# Patient Record
Sex: FEMALE | Race: WHITE | NOT HISPANIC OR LATINO
[De-identification: names, ages, dates, MRNs, and addresses within clinical notes are randomized per-mention and may not be internally consistent; named-entity substitution may affect disease eponyms.]

---

## 2023-07-18 PROBLEM — Z00.00 ENCOUNTER FOR PREVENTIVE HEALTH EXAMINATION: Status: ACTIVE | Noted: 2023-07-18

## 2023-07-21 ENCOUNTER — ASOB RESULT (OUTPATIENT)
Age: 38
End: 2023-07-21

## 2023-07-21 ENCOUNTER — NON-APPOINTMENT (OUTPATIENT)
Age: 38
End: 2023-07-21

## 2023-07-21 ENCOUNTER — APPOINTMENT (OUTPATIENT)
Dept: ANTEPARTUM | Facility: CLINIC | Age: 38
End: 2023-07-21
Payer: COMMERCIAL

## 2023-07-21 ENCOUNTER — LABORATORY RESULT (OUTPATIENT)
Age: 38
End: 2023-07-21

## 2023-07-21 PROCEDURE — 76801 OB US < 14 WKS SINGLE FETUS: CPT

## 2023-07-21 PROCEDURE — 76813 OB US NUCHAL MEAS 1 GEST: CPT | Mod: 59

## 2023-09-15 ENCOUNTER — ASOB RESULT (OUTPATIENT)
Age: 38
End: 2023-09-15

## 2023-09-15 ENCOUNTER — APPOINTMENT (OUTPATIENT)
Dept: ANTEPARTUM | Facility: CLINIC | Age: 38
End: 2023-09-15
Payer: MEDICAID

## 2023-09-15 PROCEDURE — 76811 OB US DETAILED SNGL FETUS: CPT

## 2023-11-10 ENCOUNTER — APPOINTMENT (OUTPATIENT)
Dept: ANTEPARTUM | Facility: CLINIC | Age: 38
End: 2023-11-10
Payer: MEDICAID

## 2023-11-10 ENCOUNTER — ASOB RESULT (OUTPATIENT)
Age: 38
End: 2023-11-10

## 2023-11-10 PROCEDURE — 76816 OB US FOLLOW-UP PER FETUS: CPT

## 2023-11-10 PROCEDURE — 76819 FETAL BIOPHYS PROFIL W/O NST: CPT

## 2023-12-08 ENCOUNTER — APPOINTMENT (OUTPATIENT)
Dept: ANTEPARTUM | Facility: CLINIC | Age: 38
End: 2023-12-08
Payer: COMMERCIAL

## 2023-12-08 ENCOUNTER — ASOB RESULT (OUTPATIENT)
Age: 38
End: 2023-12-08

## 2023-12-08 PROCEDURE — 76819 FETAL BIOPHYS PROFIL W/O NST: CPT

## 2023-12-08 PROCEDURE — 76816 OB US FOLLOW-UP PER FETUS: CPT

## 2024-09-14 ENCOUNTER — EMERGENCY (EMERGENCY)
Facility: HOSPITAL | Age: 39
LOS: 1 days | Discharge: HOME | End: 2024-09-14
Attending: STUDENT IN AN ORGANIZED HEALTH CARE EDUCATION/TRAINING PROGRAM
Payer: MEDICAID

## 2024-09-14 VITALS
OXYGEN SATURATION: 100 % | TEMPERATURE: 98 F | WEIGHT: 171.3 LBS | HEART RATE: 106 BPM | DIASTOLIC BLOOD PRESSURE: 75 MMHG | SYSTOLIC BLOOD PRESSURE: 121 MMHG | RESPIRATION RATE: 20 BRPM | HEIGHT: 65 IN

## 2024-09-14 LAB
ALBUMIN SERPL ELPH-MCNC: 3.1 G/DL — LOW (ref 3.5–5)
ALP SERPL-CCNC: 50 U/L — SIGNIFICANT CHANGE UP (ref 40–120)
ALT FLD-CCNC: 24 U/L DA — SIGNIFICANT CHANGE UP (ref 10–60)
ANION GAP SERPL CALC-SCNC: 4 MMOL/L — LOW (ref 5–17)
APPEARANCE UR: CLEAR — SIGNIFICANT CHANGE UP
APTT BLD: 29.9 SEC — SIGNIFICANT CHANGE UP (ref 24.5–35.6)
AST SERPL-CCNC: 16 U/L — SIGNIFICANT CHANGE UP (ref 10–40)
BASOPHILS # BLD AUTO: 0.01 K/UL — SIGNIFICANT CHANGE UP (ref 0–0.2)
BASOPHILS NFR BLD AUTO: 0.2 % — SIGNIFICANT CHANGE UP (ref 0–2)
BILIRUB SERPL-MCNC: 0.3 MG/DL — SIGNIFICANT CHANGE UP (ref 0.2–1.2)
BILIRUB UR-MCNC: NEGATIVE — SIGNIFICANT CHANGE UP
BUN SERPL-MCNC: 21 MG/DL — HIGH (ref 7–18)
CALCIUM SERPL-MCNC: 8.8 MG/DL — SIGNIFICANT CHANGE UP (ref 8.4–10.5)
CHLORIDE SERPL-SCNC: 108 MMOL/L — SIGNIFICANT CHANGE UP (ref 96–108)
CO2 SERPL-SCNC: 28 MMOL/L — SIGNIFICANT CHANGE UP (ref 22–31)
COLOR SPEC: YELLOW — SIGNIFICANT CHANGE UP
CREAT SERPL-MCNC: 0.53 MG/DL — SIGNIFICANT CHANGE UP (ref 0.5–1.3)
D DIMER BLD IA.RAPID-MCNC: 252 NG/ML DDU — HIGH
DIFF PNL FLD: NEGATIVE — SIGNIFICANT CHANGE UP
EGFR: 121 ML/MIN/1.73M2 — SIGNIFICANT CHANGE UP
EOSINOPHIL # BLD AUTO: 0.09 K/UL — SIGNIFICANT CHANGE UP (ref 0–0.5)
EOSINOPHIL NFR BLD AUTO: 1.9 % — SIGNIFICANT CHANGE UP (ref 0–6)
GLUCOSE SERPL-MCNC: 108 MG/DL — HIGH (ref 70–99)
GLUCOSE UR QL: NEGATIVE MG/DL — SIGNIFICANT CHANGE UP
HCG SERPL-ACNC: <1 MIU/ML — SIGNIFICANT CHANGE UP
HCT VFR BLD CALC: 30.5 % — LOW (ref 34.5–45)
HGB BLD-MCNC: 10.2 G/DL — LOW (ref 11.5–15.5)
IMM GRANULOCYTES NFR BLD AUTO: 0.2 % — SIGNIFICANT CHANGE UP (ref 0–0.9)
INR BLD: 0.95 RATIO — SIGNIFICANT CHANGE UP (ref 0.85–1.18)
KETONES UR-MCNC: NEGATIVE MG/DL — SIGNIFICANT CHANGE UP
LEUKOCYTE ESTERASE UR-ACNC: NEGATIVE — SIGNIFICANT CHANGE UP
LYMPHOCYTES # BLD AUTO: 1.56 K/UL — SIGNIFICANT CHANGE UP (ref 1–3.3)
LYMPHOCYTES # BLD AUTO: 32.1 % — SIGNIFICANT CHANGE UP (ref 13–44)
MAGNESIUM SERPL-MCNC: 1.7 MG/DL — SIGNIFICANT CHANGE UP (ref 1.6–2.6)
MCHC RBC-ENTMCNC: 27.3 PG — SIGNIFICANT CHANGE UP (ref 27–34)
MCHC RBC-ENTMCNC: 33.4 GM/DL — SIGNIFICANT CHANGE UP (ref 32–36)
MCV RBC AUTO: 81.6 FL — SIGNIFICANT CHANGE UP (ref 80–100)
MONOCYTES # BLD AUTO: 1.09 K/UL — HIGH (ref 0–0.9)
MONOCYTES NFR BLD AUTO: 22.4 % — HIGH (ref 2–14)
NEUTROPHILS # BLD AUTO: 2.1 K/UL — SIGNIFICANT CHANGE UP (ref 1.8–7.4)
NEUTROPHILS NFR BLD AUTO: 43.2 % — SIGNIFICANT CHANGE UP (ref 43–77)
NITRITE UR-MCNC: NEGATIVE — SIGNIFICANT CHANGE UP
NRBC # BLD: 0 /100 WBCS — SIGNIFICANT CHANGE UP (ref 0–0)
NT-PROBNP SERPL-SCNC: 157 PG/ML — HIGH (ref 0–125)
PH UR: 6.5 — SIGNIFICANT CHANGE UP (ref 5–8)
PLATELET # BLD AUTO: 266 K/UL — SIGNIFICANT CHANGE UP (ref 150–400)
POTASSIUM SERPL-MCNC: 3.9 MMOL/L — SIGNIFICANT CHANGE UP (ref 3.5–5.3)
POTASSIUM SERPL-SCNC: 3.9 MMOL/L — SIGNIFICANT CHANGE UP (ref 3.5–5.3)
PROT SERPL-MCNC: 6.5 G/DL — SIGNIFICANT CHANGE UP (ref 6–8.3)
PROT UR-MCNC: NEGATIVE MG/DL — SIGNIFICANT CHANGE UP
PROTHROM AB SERPL-ACNC: 10.8 SEC — SIGNIFICANT CHANGE UP (ref 9.5–13)
RBC # BLD: 3.74 M/UL — LOW (ref 3.8–5.2)
RBC # FLD: 13.9 % — SIGNIFICANT CHANGE UP (ref 10.3–14.5)
SODIUM SERPL-SCNC: 140 MMOL/L — SIGNIFICANT CHANGE UP (ref 135–145)
SP GR SPEC: 1.03 — SIGNIFICANT CHANGE UP (ref 1–1.03)
TSH SERPL-MCNC: <0.01 UU/ML — LOW (ref 0.34–4.82)
UROBILINOGEN FLD QL: 0.2 MG/DL — SIGNIFICANT CHANGE UP (ref 0.2–1)
WBC # BLD: 4.86 K/UL — SIGNIFICANT CHANGE UP (ref 3.8–10.5)
WBC # FLD AUTO: 4.86 K/UL — SIGNIFICANT CHANGE UP (ref 3.8–10.5)

## 2024-09-14 PROCEDURE — 71046 X-RAY EXAM CHEST 2 VIEWS: CPT

## 2024-09-14 PROCEDURE — 85610 PROTHROMBIN TIME: CPT

## 2024-09-14 PROCEDURE — 99285 EMERGENCY DEPT VISIT HI MDM: CPT | Mod: 25

## 2024-09-14 PROCEDURE — 81003 URINALYSIS AUTO W/O SCOPE: CPT

## 2024-09-14 PROCEDURE — 99285 EMERGENCY DEPT VISIT HI MDM: CPT

## 2024-09-14 PROCEDURE — 80053 COMPREHEN METABOLIC PANEL: CPT

## 2024-09-14 PROCEDURE — 93010 ELECTROCARDIOGRAM REPORT: CPT

## 2024-09-14 PROCEDURE — 84702 CHORIONIC GONADOTROPIN TEST: CPT

## 2024-09-14 PROCEDURE — 85025 COMPLETE CBC W/AUTO DIFF WBC: CPT

## 2024-09-14 PROCEDURE — 84443 ASSAY THYROID STIM HORMONE: CPT

## 2024-09-14 PROCEDURE — 93005 ELECTROCARDIOGRAM TRACING: CPT

## 2024-09-14 PROCEDURE — 83735 ASSAY OF MAGNESIUM: CPT

## 2024-09-14 PROCEDURE — 71046 X-RAY EXAM CHEST 2 VIEWS: CPT | Mod: 26

## 2024-09-14 PROCEDURE — 85730 THROMBOPLASTIN TIME PARTIAL: CPT

## 2024-09-14 PROCEDURE — 82043 UR ALBUMIN QUANTITATIVE: CPT

## 2024-09-14 PROCEDURE — 85379 FIBRIN DEGRADATION QUANT: CPT

## 2024-09-14 PROCEDURE — 36415 COLL VENOUS BLD VENIPUNCTURE: CPT

## 2024-09-14 PROCEDURE — 83880 ASSAY OF NATRIURETIC PEPTIDE: CPT

## 2024-09-14 NOTE — ED ADULT TRIAGE NOTE - CHIEF COMPLAINT QUOTE
Pt c/o swollen to both legs and feet was seen by PMD yesterday was given medication, and instructed to come to the ER.

## 2024-09-14 NOTE — ED PROVIDER NOTE - CLINICAL SUMMARY MEDICAL DECISION MAKING FREE TEXT BOX
Maggie: 38-year-old female with past medical history Graves' disease on methimazole presents with leg swelling x 1 month.  Patient reports bilateral lower extremity edema worsening over the past month, now reports bilateral leg discomfort when walking.  Patient reports occasional shortness of breath with exertion, but denies any fevers, chest pain, abdominal pain, vomiting, diarrhea, dysuria, numbness, weakness, saddle anesthesia, bowel/bladder dysfunction, or rash.  Denies any recent injury or trauma.  Patient states she went to her doctor yesterday, had labs performed, was advised to follow-up with endocrinologist.  Physical exam per above. Patient with BLE edema, unclear etiology, ddx includes but not limited to heart failure, hypoalbuminemia, nephrotic syndrome, DVT, dependent edema, secondary to thyroid dysfunction. Will obtain labs, imaging, provide supportive treatment with dispo pending workup.

## 2024-09-14 NOTE — ED PROVIDER NOTE - PATIENT PORTAL LINK FT
You can access the FollowMyHealth Patient Portal offered by [facility] by registering at the following website: [email or web address]. By joining [employer]’s FollowMyHealth portal, you will also be able to view your health information using other applications (apps) compatible with our system.

## 2024-09-14 NOTE — ED PROVIDER NOTE - NSFOLLOWUPINSTRUCTIONS_ED_ALL_ED_FT
Return in AM for US to rule out DVT.     Follow up with endocrinology as discussed.     Return with any new or worsening symptoms or concerns (see below).  _______________  Hyperthyroidism       Hyperthyroidism is when the thyroid gland is too active (overactive). The thyroid gland is a small gland located in the lower front part of the neck, just in front of the windpipe (trachea). This gland makes hormones that help control how the body uses food for energy (metabolism) as well as how the heart and brain function. These hormones also play a role in keeping your bones strong. When the thyroid is overactive, it produces too much of a hormone called thyroxine.    What are the causes?  This condition may be caused by:    Graves' disease. This is a disorder in which the body's disease-fighting system (immune system) attacks the thyroid gland. This is the most common cause.  Inflammation of the thyroid gland.  A tumor in the thyroid gland.  Use of certain medicines, including:    Prescription thyroid hormone replacement.  Herbal supplements that mimic thyroid hormones.  Amiodarone therapy.  Solid or fluid-filled lumps within your thyroid gland (thyroid nodules).  Taking in a large amount of iodine from foods or medicines.    What increases the risk?  You are more likely to develop this condition if:    You are female.  You have a family history of thyroid conditions.  You smoke tobacco.  You use a medicine called lithium.  You take medicines that affect the immune system (immunosuppressants).    What are the signs or symptoms?  Symptoms of this condition include:    Nervousness.  Inability to tolerate heat.  Unexplained weight loss.  Diarrhea.  Change in the texture of hair or skin.  Heart skipping beats or making extra beats.  Rapid heart rate.  Loss of menstruation.  Shaky hands.  Fatigue.  Restlessness.  Sleep problems.  Enlarged thyroid gland or a lump in the thyroid (nodule).    You may also have symptoms of Graves' disease, which may include:    Protruding eyes.  Dry eyes.  Red or swollen eyes.  Problems with vision.    How is this diagnosed?  This condition may be diagnosed based on:    Your symptoms and medical history.  A physical exam.  Blood tests.  Thyroid ultrasound. This test involves using sound waves to produce images of the thyroid gland.  A thyroid scan. A radioactive substance is injected into a vein, and images show how much iodine is present in the thyroid.  Radioactive iodine uptake test (RAIU). A small amount of radioactive iodine is given by mouth to see how much iodine the thyroid absorbs after a certain amount of time.    How is this treated?  Treatment depends on the cause and severity of the condition. Treatment may include:    Medicines to reduce the amount of thyroid hormone your body makes.  Radioactive iodine treatment (radioiodine therapy). This involves swallowing a small dose of radioactive iodine, in capsule or liquid form, to kill thyroid cells.  Surgery to remove part or all of your thyroid gland. You may need to take thyroid hormone replacement medicine for the rest of your life after thyroid surgery.  Medicines to help manage your symptoms.    Follow these instructions at home:     Take over-the-counter and prescription medicines only as told by your health care provider.  Do not use any products that contain nicotine or tobacco, such as cigarettes and e-cigarettes. If you need help quitting, ask your health care provider.  Follow any instructions from your health care provider about diet. You may be instructed to limit foods that contain iodine.  Keep all follow-up visits as told by your health care provider. This is important.    You will need to have blood tests regularly so that your health care provider can monitor your condition.    Contact a health care provider if:  Your symptoms do not get better with treatment.  You have a fever.  You are taking thyroid hormone replacement medicine and you:    Have symptoms of depression.  Feel like you are tired all the time.  Gain weight.    Get help right away if:  You have chest pain.  You have decreased alertness or a change in your awareness.  You have abdominal pain.  You feel dizzy.  You have a rapid heartbeat.  You have an irregular heartbeat.  You have difficulty breathing.    Summary  The thyroid gland is a small gland located in the lower front part of the neck, just in front of the windpipe (trachea).  Hyperthyroidism is when the thyroid gland is too active (overactive) and produces too much of a hormone called thyroxine.  The most common cause is Graves' disease, a disorder in which your immune system attacks the thyroid gland.  Hyperthyroidism can cause various symptoms, such as unexplained weight loss, nervousness, inability to tolerate heat, or changes in your heartbeat.  Treatment may include medicine to reduce the amount of thyroid hormone your body makes, radioiodine therapy, surgery, or medicines to manage symptoms.

## 2024-09-14 NOTE — ED ADULT NURSE NOTE - CAS DISCH CONDITION
Misha Felipe is a 64 y.o. adult in today to  discuss CPAP compliance. Learning assessment previously completed 6/5/2014; primary language is [place]. 1. Have you been to the ER, urgent care clinic since your last visit? Hospitalized since your last visit? No    2. Have you seen or consulted any other health care providers outside of the [address] [place] [address] since your last visit? Include any pap smears or colon screening.  No Stable

## 2024-09-14 NOTE — ED PROVIDER NOTE - NSFOLLOWUPCLINICS_GEN_ALL_ED_FT
[facility]  Endocrinology  [address] [place] [address]  Phone: [phone]  Fax: [phone]

## 2024-09-14 NOTE — ED PROVIDER NOTE - PHYSICAL EXAMINATION
CONSTITUTIONAL: non-toxic, well appearing  SKIN: no rash, no petechiae.  EYES: pink conjunctiva, anicteric  NECK: Supple; no meningismus, no JVD  CARD: RRR, no murmurs, equal radial pulses bilaterally 2+  RESP: CTAB, no respiratory distress  ABD: Soft, non-tender, non-distended, no peritoneal signs, no CVA tenderness  EXT: Normal ROM x4. 2+ BLE edema. No calf tenderness  NEURO: Alert, oriented. Neuro exam nonfocal, equal strength bilaterally.  PSYCH: Cooperative, appropriate.

## 2024-09-14 NOTE — ED ADULT NURSE NOTE - OBJECTIVE STATEMENT
Pt presents to the ED with c/o of Lower extremity swelling x 1 months. Pt states that pain to b/l LE x 2 weeks. Pt denies taking any pain medications prior to arrival. Pt also c/o of pelvic pain x 2 days with difficulty urinating.

## 2024-09-14 NOTE — ED PROVIDER NOTE - OBJECTIVE STATEMENT
38-year-old female with past medical history Graves' disease on methimazole presents with leg swelling x 1 month.  Patient reports bilateral lower extremity edema worsening over the past month, now reports bilateral leg discomfort when walking.  Patient reports occasional shortness of breath with exertion, but denies any fevers, chest pain, abdominal pain, vomiting, diarrhea, dysuria, numbness, weakness, saddle anesthesia, bowel/bladder dysfunction, or rash.  Denies any recent injury or trauma.  Patient states she went to her doctor yesterday, had labs performed, was advised to follow-up with endocrinologist.  Denies any additional complaints.

## 2024-09-14 NOTE — ED PROVIDER NOTE - PROGRESS NOTE DETAILS
Maryanne-: pt seen and re-evaluated at bedside.  Pt comfortable in NAD.  Discussed lab/imaging results with pt. CXR without evidence of fluid overload, d-dimer mildly elevated, pt declining to stay overnight for US r/o DVT, requests to return in AM. TSH undetectable, pt states she was recently diagnosed with Grave's Disease and started methimazole 2 days ago. Suspect leg swelling secondary to thyroid dysfunction. Discussed return in AM for US for DVT, will expedite endocrinology follow up, discussed return precautions, pt understood and agreeable with plan.

## 2024-09-14 NOTE — ED ADULT NURSE NOTE - NSFALLUNIVINTERV_ED_ALL_ED
Bed/Stretcher in lowest position, wheels locked, appropriate side rails in place/Call bell, personal items and telephone in reach/Instruct patient to call for assistance before getting out of bed/chair/stretcher/Non-slip footwear applied when patient is off stretcher/[place] to call system/Physically safe environment - no spills, clutter or unnecessary equipment/Purposeful proactive rounding/Room/bathroom lighting operational, light cord in reach

## 2024-09-15 ENCOUNTER — EMERGENCY (EMERGENCY)
Facility: HOSPITAL | Age: 39
LOS: 1 days | Discharge: HOME | End: 2024-09-15
Attending: EMERGENCY MEDICINE
Payer: MEDICAID

## 2024-09-15 VITALS
WEIGHT: 165.35 LBS | DIASTOLIC BLOOD PRESSURE: 75 MMHG | HEART RATE: 98 BPM | TEMPERATURE: 98 F | HEIGHT: 65 IN | SYSTOLIC BLOOD PRESSURE: 118 MMHG | RESPIRATION RATE: 16 BRPM | OXYGEN SATURATION: 98 %

## 2024-09-15 VITALS
TEMPERATURE: 98 F | SYSTOLIC BLOOD PRESSURE: 115 MMHG | RESPIRATION RATE: 18 BRPM | DIASTOLIC BLOOD PRESSURE: 75 MMHG | HEART RATE: 104 BPM | OXYGEN SATURATION: 98 %

## 2024-09-15 LAB
ALBUMIN, RANDOM URINE: <1.2 MG/DL — SIGNIFICANT CHANGE UP
ALBUMIN/CREATININE RATIO (ACR): SIGNIFICANT CHANGE UP MG/G (ref 0–30)
CREAT ?TM UR-MCNC: 107 MG/DL — SIGNIFICANT CHANGE UP

## 2024-09-15 PROCEDURE — 99284 EMERGENCY DEPT VISIT MOD MDM: CPT

## 2024-09-15 PROCEDURE — 93971 EXTREMITY STUDY: CPT

## 2024-09-15 PROCEDURE — 99284 EMERGENCY DEPT VISIT MOD MDM: CPT | Mod: 25

## 2024-09-15 PROCEDURE — 93971 EXTREMITY STUDY: CPT | Mod: 26,LT

## 2024-09-15 NOTE — ED PROVIDER NOTE - PATIENT PORTAL LINK FT
You can access the FollowMyHealth Patient Portal offered by [facility] by registering at the following website: [email or web address]. By joining [employer]’s FollowMyHealth portal, you will also be able to view your health information using other applications (apps) compatible with our system.

## 2024-09-15 NOTE — ED PROVIDER NOTE - NSPTACCESSSVCSAPPTDETAILS_ED_ALL_ED_FT
hyperthyroidism, abnormal lab - within 1 week hyperthyroidism, abnormal lab - within 1 week (can drive to appointment)

## 2024-09-15 NOTE — ED ADULT TRIAGE NOTE - CHIEF COMPLAINT QUOTE
c/o swelling up and pain to left lower leg x one month, gotten worse today, denies recent surgery, "I came here yesterday"

## 2024-09-15 NOTE — ED PROVIDER NOTE - CLINICAL SUMMARY MEDICAL DECISION MAKING FREE TEXT BOX
38 year-old female, presents with multiple medical complaints for 1 month. On exam well-appearing, vital signs stable, afebrile.  Doppler negative for DVT.  No signs of psychosis.  No signs of arrhythmia or tachycardia.  No focal neuro deficit on exam.  Yesterday TSH was less than 0.01.  At this time patient will need to follow-up with PCP and endocrinologist this week.  Discussed red flags to come back to the hospital.

## 2024-09-15 NOTE — ED PROVIDER NOTE - PHYSICAL EXAMINATION
Bilateral lower extremity equally warm and without erythema.  Bilateral lower extremity edema below the knee.  DP/PT pulses intact 2+ bilaterally.  Left calf tenderness to palpation.  No right calf tenderness.  Thyroid midline without tenderness.

## 2024-09-15 NOTE — ED PROVIDER NOTE - NSFOLLOWUPINSTRUCTIONS_ED_ALL_ED_FT
Follow up with the primary care doctor within 2-3 days.  Follow up with the endocrinologist within 1 week.  If you experience any new or worsening symptoms or if you are concerned you can always come back to the emergency for a re-evaluation.  Some results may not be available at the time of your discharge from the hospital. You can download the FOLLOW MY HEALTH [place] and have access to these results.

## 2024-09-15 NOTE — ED ADULT NURSE NOTE - NSFALLUNIVINTERV_ED_ALL_ED
Bed/Stretcher in lowest position, wheels locked, appropriate side rails in place/Call bell, personal items and telephone in reach/Instruct patient to call for assistance before getting out of bed/chair/stretcher/Non-slip footwear applied when patient is off stretcher/[place] to call system/Physically safe environment - no spills, clutter or unnecessary equipment/Purposeful proactive rounding/Room/bathroom lighting operational, light cord in reach

## 2024-09-15 NOTE — ED PROVIDER NOTE - OBJECTIVE STATEMENT
38-year-old female, history of Graves' disease on methimazole, presents for ultrasound after referral by ER provider last night to rule out DVT.  Patient reports that for the last 1 month has been having intermittent bilateral leg swelling/pain, intermittent palpitations, intermittent dyspnea on exertion.  A few days ago went to her PCP and in the office was tachycardic so was referred to the ER.  Patient waited for the next day to go to the emergency room.  Yesterday was evaluated and had blood work done and was told to come back today.  Patient denies any palpitations, headache, shortness of breath at this time.  Still reporting bilateral leg swelling and left leg pain.  Also feels some tingling sensation to bilateral feet soles.  Denies any fever, chills, night sweats or any other complaints.

## 2025-07-11 NOTE — ED ADULT NURSE NOTE - NS_NURSE_DISC_ED_ALL_ED_PROVIDEDBY
07/11/25 0947   OTHER   Discipline physical therapist   Rehab Time/Intention   Session Not Performed other (see comments)  (Spoke to RN, pt mobilizing ad jasmin, [facility] 24/24. No indication for formal PT eval, PT signing off.)   Therapy Assessment/Plan (PT)   Criteria for Skilled Interventions Met (PT) no;no problems identified which require skilled intervention       
ED MD